# Patient Record
Sex: FEMALE | Race: WHITE | URBAN - METROPOLITAN AREA
[De-identification: names, ages, dates, MRNs, and addresses within clinical notes are randomized per-mention and may not be internally consistent; named-entity substitution may affect disease eponyms.]

---

## 2021-11-02 ENCOUNTER — OFFICE VISIT (OUTPATIENT)
Dept: URBAN - METROPOLITAN AREA CLINIC 56 | Facility: CLINIC | Age: 76
End: 2021-11-02
Payer: MEDICARE

## 2021-11-02 PROCEDURE — 92134 CPTRZ OPH DX IMG PST SGM RTA: CPT | Performed by: OPTOMETRIST

## 2021-11-02 PROCEDURE — 99204 OFFICE O/P NEW MOD 45 MIN: CPT | Performed by: OPTOMETRIST

## 2021-11-02 ASSESSMENT — KERATOMETRY
OS: 43.72
OD: 43.22

## 2021-11-02 ASSESSMENT — VISUAL ACUITY
OD: 20/30
OS: 20/25

## 2021-11-02 ASSESSMENT — INTRAOCULAR PRESSURE
OD: 16
OS: 16

## 2021-11-02 NOTE — IMPRESSION/PLAN
Impression: Combined forms of age-related cataract, bilateral: H25.813. Plan: Discussed cataract diagnosis with the patient. Discussed and reviewed treatment options for cataracts. Risks and benefits of surgical treatment were discussed and understood. Patient elects surgical treatment. Recommend surgery OU, OD first.  Patient is candidate/interested in multifocal, toric, standard, LenSx and ORA. Aim OD: plano. Aim OS: plano. Patient will need glasses for all near work, including computer.  Outcome of surgery limitations include:  Vitreous degeneration, bilateral,

## 2021-12-03 ENCOUNTER — ADULT PHYSICAL (OUTPATIENT)
Dept: URBAN - METROPOLITAN AREA CLINIC 56 | Facility: CLINIC | Age: 76
End: 2021-12-03
Payer: MEDICARE

## 2021-12-03 DIAGNOSIS — Z01.818 ENCOUNTER FOR OTHER PREPROCEDURAL EXAMINATION: Primary | ICD-10-CM

## 2021-12-03 PROCEDURE — 92025 CPTRIZED CORNEAL TOPOGRAPHY: CPT | Performed by: OPHTHALMOLOGY

## 2021-12-03 PROCEDURE — 99203 OFFICE O/P NEW LOW 30 MIN: CPT | Performed by: PHYSICIAN ASSISTANT

## 2021-12-06 ENCOUNTER — PRE-OPERATIVE VISIT (OUTPATIENT)
Dept: URBAN - METROPOLITAN AREA CLINIC 44 | Facility: CLINIC | Age: 76
End: 2021-12-06
Payer: MEDICARE

## 2021-12-06 DIAGNOSIS — H43.813 VITREOUS DEGENERATION, BILATERAL: ICD-10-CM

## 2021-12-06 DIAGNOSIS — H25.812 COMBINED FORMS OF AGE-RELATED CATARACT, LEFT EYE: ICD-10-CM

## 2021-12-06 DIAGNOSIS — H04.123 DRY EYE SYNDROME OF BILATERAL LACRIMAL GLANDS: ICD-10-CM

## 2021-12-06 DIAGNOSIS — H25.813 COMBINED FORMS OF AGE-RELATED CATARACT, BILATERAL: Primary | ICD-10-CM

## 2021-12-06 PROCEDURE — 99204 OFFICE O/P NEW MOD 45 MIN: CPT | Performed by: OPHTHALMOLOGY

## 2021-12-06 ASSESSMENT — INTRAOCULAR PRESSURE
OD: 14
OS: 14

## 2021-12-06 NOTE — IMPRESSION/PLAN
Impression: Combined forms of age-related cataract, bilateral: H25.813. Plan: Discussed cataract diagnosis with the patient. Cataracts are limiting vision. Discussed risks, benefits and alternatives to surgery including but not limited to: bleeding, infection, risk of vision loss, loss of the eye, need for other surgery. Patient voiced understanding and wishes to proceed. Patient elects surgical treatment. Specialty lens options discussed and pt declines. Patient desires surgery OU, OD first, Standard IOL  (( AIM :  -0.25 OU, DEXYCU OU,  NO LENSX,  NO ORA , NO LRI OU- AMP )) Patient understands the need for glasses after surgery for BCVA.

## 2021-12-06 NOTE — IMPRESSION/PLAN
Impression: Combined forms of age-related cataract, left eye: H25.812. Plan: May proceed with 2nd eye once 1st eye is cleared.

## 2021-12-15 ENCOUNTER — SURGERY (OUTPATIENT)
Dept: URBAN - METROPOLITAN AREA SURGERY 19 | Facility: SURGERY | Age: 76
End: 2021-12-15
Payer: MEDICARE

## 2021-12-15 PROCEDURE — 66984 XCAPSL CTRC RMVL W/O ECP: CPT | Performed by: OPHTHALMOLOGY

## 2021-12-16 ENCOUNTER — POST-OPERATIVE VISIT (OUTPATIENT)
Dept: URBAN - METROPOLITAN AREA CLINIC 56 | Facility: CLINIC | Age: 76
End: 2021-12-16

## 2021-12-16 DIAGNOSIS — Z48.810 ENCOUNTER FOR SURGICAL AFTERCARE FOLLOWING SURGERY ON A SENSE ORGAN: Primary | ICD-10-CM

## 2021-12-16 PROCEDURE — 99024 POSTOP FOLLOW-UP VISIT: CPT | Performed by: STUDENT IN AN ORGANIZED HEALTH CARE EDUCATION/TRAINING PROGRAM

## 2021-12-16 ASSESSMENT — INTRAOCULAR PRESSURE: OD: 20

## 2021-12-16 NOTE — IMPRESSION/PLAN
Impression: S/P Cataract Extraction by phacoemulsification with IOL placement OD - 1 Day. Encounter for surgical aftercare following surgery on a sense organ  Z48.810. Excellent post op course   Post operative instructions reviewed - Plan: good IOP. Call ASAP if pain or decreased vision.

## 2021-12-22 ENCOUNTER — POST-OPERATIVE VISIT (OUTPATIENT)
Dept: URBAN - METROPOLITAN AREA CLINIC 56 | Facility: CLINIC | Age: 76
End: 2021-12-22

## 2021-12-22 PROCEDURE — 99024 POSTOP FOLLOW-UP VISIT: CPT | Performed by: STUDENT IN AN ORGANIZED HEALTH CARE EDUCATION/TRAINING PROGRAM

## 2021-12-22 PROCEDURE — 92134 CPTRZ OPH DX IMG PST SGM RTA: CPT | Performed by: STUDENT IN AN ORGANIZED HEALTH CARE EDUCATION/TRAINING PROGRAM

## 2021-12-22 ASSESSMENT — INTRAOCULAR PRESSURE
OS: 17
OD: 17

## 2021-12-22 ASSESSMENT — VISUAL ACUITY
OS: 20/25
OD: 20/30

## 2021-12-22 NOTE — IMPRESSION/PLAN
Impression: S/P Cataract Extraction by phacoemulsification with IOL placement OD - 7 Days. Encounter for surgical aftercare following surgery on a sense organ  Z48.810. Excellent post op course   Post operative instructions reviewed - Plan: good IOP, no infection. Discussed likely need for YAG cap when healed.

## 2021-12-29 ENCOUNTER — SURGERY (OUTPATIENT)
Dept: URBAN - METROPOLITAN AREA SURGERY 19 | Facility: SURGERY | Age: 76
End: 2021-12-29
Payer: MEDICARE

## 2021-12-29 PROCEDURE — 66984 XCAPSL CTRC RMVL W/O ECP: CPT | Performed by: OPHTHALMOLOGY

## 2021-12-29 RX ORDER — PREDNISOLONE ACETATE 10 MG/ML
1 % SUSPENSION/ DROPS OPHTHALMIC
Qty: 10 | Refills: 1 | Status: INACTIVE
Start: 2021-12-29 | End: 2021-12-29

## 2021-12-29 RX ORDER — PREDNISOLONE ACETATE 10 MG/ML
1 % SUSPENSION/ DROPS OPHTHALMIC
Qty: 10 | Refills: 1 | Status: INACTIVE
Start: 2021-12-29 | End: 2022-01-24

## 2021-12-30 ENCOUNTER — POST-OPERATIVE VISIT (OUTPATIENT)
Dept: URBAN - METROPOLITAN AREA CLINIC 56 | Facility: CLINIC | Age: 76
End: 2021-12-30

## 2021-12-30 PROCEDURE — 99024 POSTOP FOLLOW-UP VISIT: CPT | Performed by: STUDENT IN AN ORGANIZED HEALTH CARE EDUCATION/TRAINING PROGRAM

## 2021-12-30 ASSESSMENT — INTRAOCULAR PRESSURE
OS: 28
OS: 20

## 2021-12-30 NOTE — IMPRESSION/PLAN
Impression: S/P Cataract Extraction by phacoemulsification with IOL placement OS - 1 Day. Presence of intraocular lens  Z96.1. Excellent post op course   Post operative instructions reviewed - Plan: good IOP, no infection. Restrictions discussed. Call if worsening pain or vision.

## 2022-01-06 ENCOUNTER — POST-OPERATIVE VISIT (OUTPATIENT)
Dept: URBAN - METROPOLITAN AREA CLINIC 56 | Facility: CLINIC | Age: 77
End: 2022-01-06

## 2022-01-06 PROCEDURE — 99024 POSTOP FOLLOW-UP VISIT: CPT | Performed by: STUDENT IN AN ORGANIZED HEALTH CARE EDUCATION/TRAINING PROGRAM

## 2022-01-06 ASSESSMENT — INTRAOCULAR PRESSURE
OD: 17
OS: 19

## 2022-01-06 NOTE — IMPRESSION/PLAN
Impression: S/P CE/Standard IOL OS - 8 Days. Presence of intraocular lens  Z96.1. Excellent post op course   Post operative instructions reviewed - Plan: good IOP. Use prednisolone TID OU on weekly taper. Call with pain or decreased vision.

## 2022-01-24 ENCOUNTER — POST-OPERATIVE VISIT (OUTPATIENT)
Dept: URBAN - METROPOLITAN AREA CLINIC 56 | Facility: CLINIC | Age: 77
End: 2022-01-24
Payer: MEDICARE

## 2022-01-24 PROCEDURE — 92134 CPTRZ OPH DX IMG PST SGM RTA: CPT | Performed by: STUDENT IN AN ORGANIZED HEALTH CARE EDUCATION/TRAINING PROGRAM

## 2022-01-24 PROCEDURE — 99024 POSTOP FOLLOW-UP VISIT: CPT | Performed by: STUDENT IN AN ORGANIZED HEALTH CARE EDUCATION/TRAINING PROGRAM

## 2022-01-24 RX ORDER — KETOROLAC TROMETHAMINE 5 MG/ML
0.5 % SOLUTION OPHTHALMIC
Qty: 5 | Refills: 1 | Status: INACTIVE
Start: 2022-01-24 | End: 2022-01-25

## 2022-01-24 RX ORDER — PREDNISOLONE ACETATE 10 MG/ML
1 % SUSPENSION/ DROPS OPHTHALMIC
Qty: 10 | Refills: 1 | Status: ACTIVE
Start: 2022-01-24

## 2022-01-24 ASSESSMENT — VISUAL ACUITY
OS: 20/30
OD: 20/50

## 2022-01-24 ASSESSMENT — INTRAOCULAR PRESSURE
OD: 17
OS: 20

## 2022-01-24 NOTE — IMPRESSION/PLAN
Impression: S/P Cataract Extraction by phacoemulsification with IOL placement OS - 26 Days. Presence of intraocular lens  Z96.1. Plan: significant persistent AC reaction and now with ME OD > OS. Discussed findings with patient. If persistent AC reaction next visit, will consider inflammatory bloodwork panel. Increase prednisolone QID OU on BIWEEKLY taper Start ketorolac QID OU Recheck with mac OCT 3-4 weeks. If still with ME, will send for retina consult.

## 2022-01-24 NOTE — IMPRESSION/PLAN
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 Plan: start ketorolac QID OU. see Z96.1

## 2022-02-14 ENCOUNTER — POST-OPERATIVE VISIT (OUTPATIENT)
Dept: URBAN - METROPOLITAN AREA CLINIC 56 | Facility: CLINIC | Age: 77
End: 2022-02-14
Payer: MEDICARE

## 2022-02-14 DIAGNOSIS — Z96.1 PRESENCE OF INTRAOCULAR LENS: Primary | ICD-10-CM

## 2022-02-14 DIAGNOSIS — H59.033 CYSTOID MACULAR EDEMA FOLLOWING CATARACT SURGERY, BILATERAL: ICD-10-CM

## 2022-02-14 PROCEDURE — 92134 CPTRZ OPH DX IMG PST SGM RTA: CPT | Performed by: STUDENT IN AN ORGANIZED HEALTH CARE EDUCATION/TRAINING PROGRAM

## 2022-02-14 PROCEDURE — 99024 POSTOP FOLLOW-UP VISIT: CPT | Performed by: STUDENT IN AN ORGANIZED HEALTH CARE EDUCATION/TRAINING PROGRAM

## 2022-02-14 ASSESSMENT — INTRAOCULAR PRESSURE
OS: 20
OD: 20

## 2022-02-14 NOTE — IMPRESSION/PLAN
Impression: S/P Cataract Extraction by phacoemulsification with IOL placement OS - 47 Days. Presence of intraocular lens  Z96.1. Excellent post op course   Post operative instructions reviewed - Plan: improving CME. Continue ketorolac QID OU, and prednisolone on biweekly taper as directed. Will need YAG cap OD at 3 months post op.  

Keep f/u with Dr. Beckie Burgess as scheduled

## 2022-03-09 ENCOUNTER — OFFICE VISIT (OUTPATIENT)
Dept: URBAN - METROPOLITAN AREA CLINIC 56 | Facility: CLINIC | Age: 77
End: 2022-03-09
Payer: MEDICARE

## 2022-03-09 DIAGNOSIS — H26.491 OTHER SECONDARY CATARACT, RIGHT EYE: ICD-10-CM

## 2022-03-09 DIAGNOSIS — H35.353 CYSTOID MACULAR DEGENERATION, BILATERAL: Primary | ICD-10-CM

## 2022-03-09 PROCEDURE — 92134 CPTRZ OPH DX IMG PST SGM RTA: CPT | Performed by: OPHTHALMOLOGY

## 2022-03-09 PROCEDURE — 92014 COMPRE OPH EXAM EST PT 1/>: CPT | Performed by: OPHTHALMOLOGY

## 2022-03-09 PROCEDURE — 92242 FLUORESCEIN&ICG ANGIOGRAPHY: CPT | Performed by: OPHTHALMOLOGY

## 2022-03-09 ASSESSMENT — INTRAOCULAR PRESSURE
OD: 18
OS: 19

## 2022-03-23 ENCOUNTER — SURGERY (OUTPATIENT)
Dept: URBAN - METROPOLITAN AREA SURGERY 19 | Facility: SURGERY | Age: 77
End: 2022-03-23
Payer: MEDICARE

## 2022-03-23 PROCEDURE — 66821 AFTER CATARACT LASER SURGERY: CPT | Performed by: OPHTHALMOLOGY

## 2022-04-26 ENCOUNTER — POST-OPERATIVE VISIT (OUTPATIENT)
Dept: URBAN - METROPOLITAN AREA CLINIC 56 | Facility: CLINIC | Age: 77
End: 2022-04-26
Payer: MEDICARE

## 2022-04-26 DIAGNOSIS — Z48.810 ENCOUNTER FOR SURGICAL AFTERCARE FOLLOWING SURGERY ON A SENSE ORGAN: Primary | ICD-10-CM

## 2022-04-26 PROCEDURE — 99024 POSTOP FOLLOW-UP VISIT: CPT | Performed by: STUDENT IN AN ORGANIZED HEALTH CARE EDUCATION/TRAINING PROGRAM

## 2022-04-26 ASSESSMENT — INTRAOCULAR PRESSURE
OD: 14
OS: 14

## 2022-04-26 NOTE — IMPRESSION/PLAN
Impression:  Encounter for surgical aftercare following surgery on a sense organ  Z48.810. Excellent post op course   Post operative instructions reviewed - Plan: S/P YAG OD, no AC reaction. Restrictions lifted. Call with worsening pain or vision. No ME on mac OCT. Pt happy with distance vision, continue with OTC readers.  

return as scheduled for CE in Nov.

## 2022-11-01 ENCOUNTER — OFFICE VISIT (OUTPATIENT)
Dept: URBAN - METROPOLITAN AREA CLINIC 56 | Facility: CLINIC | Age: 77
End: 2022-11-01
Payer: MEDICARE

## 2022-11-01 DIAGNOSIS — H43.813 VITREOUS DEGENERATION, BILATERAL: ICD-10-CM

## 2022-11-01 DIAGNOSIS — Z96.1 PRESENCE OF INTRAOCULAR LENS: Primary | ICD-10-CM

## 2022-11-01 DIAGNOSIS — H52.4 PRESBYOPIA: ICD-10-CM

## 2022-11-01 PROCEDURE — 92134 CPTRZ OPH DX IMG PST SGM RTA: CPT | Performed by: OPTOMETRIST

## 2022-11-01 PROCEDURE — 92014 COMPRE OPH EXAM EST PT 1/>: CPT | Performed by: OPTOMETRIST

## 2022-11-01 ASSESSMENT — INTRAOCULAR PRESSURE
OD: 13
OS: 14

## 2022-11-01 ASSESSMENT — KERATOMETRY
OS: 43.38
OD: 43.24

## 2022-11-01 ASSESSMENT — VISUAL ACUITY
OS: 20/20
OD: 20/20